# Patient Record
Sex: FEMALE | Race: WHITE | NOT HISPANIC OR LATINO | URBAN - METROPOLITAN AREA
[De-identification: names, ages, dates, MRNs, and addresses within clinical notes are randomized per-mention and may not be internally consistent; named-entity substitution may affect disease eponyms.]

---

## 2020-06-18 ENCOUNTER — OFFICE VISIT (OUTPATIENT)
Dept: URBAN - METROPOLITAN AREA CLINIC 23 | Facility: CLINIC | Age: 70
End: 2020-06-18

## 2020-06-18 RX ORDER — DEXLANSOPRAZOLE 30 MG/1
TAKE 1 CAPSULE (30 MG) BY ORAL ROUTE ONCE DAILY SWALLOWING WHOLE. DO NOT CRUSH, CHEW AND/OR DIVIDE CAPSULE, DELAYED RELEASE ORAL 1
Qty: 90 | Refills: 1 | Status: ACTIVE | COMMUNITY
Start: 2020-05-12 | End: 1900-01-01

## 2020-06-18 RX ORDER — IBUPROFEN 200 MG/1
TAKE 1 TABLET (200 MG) BY ORAL ROUTE EVERY 6 HOURS AS NEEDED WITH FOOD TABLET, COATED ORAL
Qty: 0 | Refills: 0 | Status: ACTIVE | COMMUNITY
Start: 1900-01-01 | End: 1900-01-01

## 2020-06-18 RX ORDER — TRIAMTERENE AND HYDROCHLOROTHIAZIDE 37.5; 25 MG/1; MG/1
TAKE 1 CAPSULE BY ORAL ROUTE ONCE DAILY CAPSULE ORAL 1
Qty: 0 | Refills: 0 | Status: ACTIVE | COMMUNITY
Start: 1900-01-01 | End: 1900-01-01

## 2020-06-18 RX ORDER — NYSTATIN AND TRIAMCINOLONE ACETONIDE 100000; 1 [USP'U]/G; MG/G
OINTMENT TOPICAL
Qty: 0 | Refills: 0 | Status: ACTIVE | COMMUNITY
Start: 1900-01-01 | End: 1900-01-01

## 2020-11-30 ENCOUNTER — OFFICE VISIT (OUTPATIENT)
Dept: URBAN - METROPOLITAN AREA CLINIC 23 | Facility: CLINIC | Age: 70
End: 2020-11-30

## 2021-02-01 ENCOUNTER — OFFICE VISIT (OUTPATIENT)
Dept: URBAN - METROPOLITAN AREA CLINIC 23 | Facility: CLINIC | Age: 71
End: 2021-02-01
Payer: MEDICARE

## 2021-02-01 VITALS — WEIGHT: 169 LBS | TEMPERATURE: 96.1 F | BODY MASS INDEX: 33.18 KG/M2 | HEIGHT: 60 IN

## 2021-02-01 DIAGNOSIS — K31.89 INTESTINAL METAPLASIA OF GASTRIC CARDIA: ICD-10-CM

## 2021-02-01 DIAGNOSIS — Z12.11 SCREEN FOR COLON CANCER: ICD-10-CM

## 2021-02-01 DIAGNOSIS — K20.90 ESOPHAGITIS: ICD-10-CM

## 2021-02-01 DIAGNOSIS — K20.80 ESOPHAGITIS, LOS ANGELES GRADE B: ICD-10-CM

## 2021-02-01 DIAGNOSIS — K21.9 ACID REFLUX DISEASE: ICD-10-CM

## 2021-02-01 PROCEDURE — 99213 OFFICE O/P EST LOW 20 MIN: CPT | Performed by: INTERNAL MEDICINE

## 2021-02-01 PROCEDURE — 3017F COLORECTAL CA SCREEN DOC REV: CPT | Performed by: INTERNAL MEDICINE

## 2021-02-01 PROCEDURE — 1036F TOBACCO NON-USER: CPT | Performed by: INTERNAL MEDICINE

## 2021-02-01 PROCEDURE — G8420 CALC BMI NORM PARAMETERS: HCPCS | Performed by: INTERNAL MEDICINE

## 2021-02-01 PROCEDURE — G8427 DOCREV CUR MEDS BY ELIG CLIN: HCPCS | Performed by: INTERNAL MEDICINE

## 2021-02-01 PROCEDURE — G9903 PT SCRN TBCO ID AS NON USER: HCPCS | Performed by: INTERNAL MEDICINE

## 2021-02-01 RX ORDER — IBUPROFEN 200 MG/1
TAKE 1 TABLET (200 MG) BY ORAL ROUTE EVERY 6 HOURS AS NEEDED WITH FOOD TABLET, COATED ORAL
Qty: 0 | Refills: 0 | COMMUNITY
Start: 1900-01-01

## 2021-02-01 RX ORDER — DEXLANSOPRAZOLE 30 MG/1
TAKE 1 CAPSULE (30 MG) BY ORAL ROUTE ONCE DAILY SWALLOWING WHOLE. DO NOT CRUSH, CHEW AND/OR DIVIDE CAPSULE, DELAYED RELEASE ORAL 1
Qty: 90 | Refills: 1 | COMMUNITY
Start: 2020-05-12

## 2021-02-01 RX ORDER — TRIAMTERENE AND HYDROCHLOROTHIAZIDE 37.5; 25 MG/1; MG/1
TAKE 1 CAPSULE BY ORAL ROUTE ONCE DAILY CAPSULE ORAL 1
Qty: 0 | Refills: 0 | COMMUNITY
Start: 1900-01-01

## 2021-02-01 RX ORDER — NYSTATIN AND TRIAMCINOLONE ACETONIDE 100000; 1 [USP'U]/G; MG/G
OINTMENT TOPICAL
Qty: 0 | Refills: 0 | COMMUNITY
Start: 1900-01-01

## 2021-02-01 NOTE — HPI-TODAY'S VISIT:
71 yo female presenting for f/u- last seen in 11/2019  she is doing well. using the dexilant as needed at this point actually.  using 1-2 times a week at most. usually food related- spicy food , tomatoes -she states that she is trying to modify the diet -has gained weight during covid -she is walking 3-5 miles a day -no abdominal pain no n/v/ dysphagia

## 2021-03-14 PROBLEM — 718346009 INTESTINAL METAPLASIA OF GASTRIC CARDIA: Status: ACTIVE | Noted: 2021-02-01

## 2021-04-06 ENCOUNTER — OFFICE VISIT (OUTPATIENT)
Dept: URBAN - METROPOLITAN AREA SURGERY CENTER 15 | Facility: SURGERY CENTER | Age: 71
End: 2021-04-06
Payer: MEDICARE

## 2021-04-06 ENCOUNTER — CLAIMS CREATED FROM THE CLAIM WINDOW (OUTPATIENT)
Dept: URBAN - METROPOLITAN AREA CLINIC 4 | Facility: CLINIC | Age: 71
End: 2021-04-06
Payer: MEDICARE

## 2021-04-06 DIAGNOSIS — K29.60 OTHER GASTRITIS WITHOUT BLEEDING: ICD-10-CM

## 2021-04-06 DIAGNOSIS — Z12.11 COLON CANCER SCREENING: ICD-10-CM

## 2021-04-06 DIAGNOSIS — D12.4 ADENOMA OF DESCENDING COLON: ICD-10-CM

## 2021-04-06 DIAGNOSIS — K22.70 BARRETT'S ESOPHAGUS WITHOUT DYSPLASIA: ICD-10-CM

## 2021-04-06 DIAGNOSIS — B96.81 BACTERIAL INFECTION DUE TO H. PYLORI: ICD-10-CM

## 2021-04-06 DIAGNOSIS — K22.70 BARRETT ESOPHAGUS: ICD-10-CM

## 2021-04-06 DIAGNOSIS — D12.4 BENIGN NEOPLASM OF DESCENDING COLON: ICD-10-CM

## 2021-04-06 DIAGNOSIS — K29.60 ADENOPAPILLOMATOSIS GASTRICA: ICD-10-CM

## 2021-04-06 DIAGNOSIS — B96.81 HELICOBACTER PYLORI [H. PYLORI] AS THE CAUSE OF DISEASES CLASSIFIED ELSEWHERE: ICD-10-CM

## 2021-04-06 PROCEDURE — 88305 TISSUE EXAM BY PATHOLOGIST: CPT | Performed by: PATHOLOGY

## 2021-04-06 PROCEDURE — G8907 PT DOC NO EVENTS ON DISCHARG: HCPCS | Performed by: INTERNAL MEDICINE

## 2021-04-06 PROCEDURE — 88312 SPECIAL STAINS GROUP 1: CPT | Performed by: PATHOLOGY

## 2021-04-06 PROCEDURE — 45380 COLONOSCOPY AND BIOPSY: CPT | Performed by: INTERNAL MEDICINE

## 2021-04-06 PROCEDURE — 43239 EGD BIOPSY SINGLE/MULTIPLE: CPT | Performed by: INTERNAL MEDICINE

## 2021-04-06 RX ORDER — NYSTATIN AND TRIAMCINOLONE ACETONIDE 100000; 1 [USP'U]/G; MG/G
OINTMENT TOPICAL
Qty: 0 | Refills: 0 | COMMUNITY
Start: 1900-01-01

## 2021-04-06 RX ORDER — TRIAMTERENE AND HYDROCHLOROTHIAZIDE 37.5; 25 MG/1; MG/1
TAKE 1 CAPSULE BY ORAL ROUTE ONCE DAILY CAPSULE ORAL 1
Qty: 0 | Refills: 0 | COMMUNITY
Start: 1900-01-01

## 2021-04-06 RX ORDER — DEXLANSOPRAZOLE 30 MG/1
TAKE 1 CAPSULE (30 MG) BY ORAL ROUTE ONCE DAILY SWALLOWING WHOLE. DO NOT CRUSH, CHEW AND/OR DIVIDE CAPSULE, DELAYED RELEASE ORAL 1
Qty: 90 | Refills: 1 | COMMUNITY
Start: 2020-05-12

## 2021-04-06 RX ORDER — IBUPROFEN 200 MG/1
TAKE 1 TABLET (200 MG) BY ORAL ROUTE EVERY 6 HOURS AS NEEDED WITH FOOD TABLET, COATED ORAL
Qty: 0 | Refills: 0 | COMMUNITY
Start: 1900-01-01

## 2021-04-29 ENCOUNTER — TELEPHONE ENCOUNTER (OUTPATIENT)
Dept: URBAN - METROPOLITAN AREA CLINIC 92 | Facility: CLINIC | Age: 71
End: 2021-04-29

## 2021-04-29 RX ORDER — AMOXICILLIN 500 MG/1
2 CAPSULES TABLET, FILM COATED ORAL TWICE A DAY
Qty: 56 | Refills: 0 | OUTPATIENT
Start: 2021-05-11

## 2021-04-29 RX ORDER — CLARITHROMYCIN 500 MG/1
1 TABLET TABLET, FILM COATED ORAL
Qty: 28 TABLET | Refills: 0 | OUTPATIENT
Start: 2021-05-11 | End: 2021-05-25

## 2021-05-14 ENCOUNTER — TELEPHONE ENCOUNTER (OUTPATIENT)
Dept: URBAN - METROPOLITAN AREA CLINIC 49 | Facility: CLINIC | Age: 71
End: 2021-05-14

## 2021-05-14 RX ORDER — DEXLANSOPRAZOLE 30 MG/1
TAKE 1 CAPSULE (30 MG) BY ORAL ROUTE ONCE DAILY SWALLOWING WHOLE. DO NOT CRUSH, CHEW AND/OR DIVIDE CAPSULE, DELAYED RELEASE ORAL 1
Qty: 90 | Refills: 1
Start: 2020-05-12

## 2021-06-14 ENCOUNTER — OFFICE VISIT (OUTPATIENT)
Dept: URBAN - METROPOLITAN AREA CLINIC 23 | Facility: CLINIC | Age: 71
End: 2021-06-14
Payer: MEDICARE

## 2021-06-14 VITALS
TEMPERATURE: 97.9 F | HEIGHT: 60 IN | WEIGHT: 160 LBS | BODY MASS INDEX: 31.41 KG/M2 | DIASTOLIC BLOOD PRESSURE: 97 MMHG | HEART RATE: 60 BPM | SYSTOLIC BLOOD PRESSURE: 174 MMHG

## 2021-06-14 DIAGNOSIS — A04.8 HELICOBACTER PYLORI (H. PYLORI): ICD-10-CM

## 2021-06-14 DIAGNOSIS — K20.90 ESOPHAGITIS: ICD-10-CM

## 2021-06-14 DIAGNOSIS — K59.09 CHRONIC CONSTIPATION: ICD-10-CM

## 2021-06-14 DIAGNOSIS — K21.9 ACID REFLUX DISEASE: ICD-10-CM

## 2021-06-14 DIAGNOSIS — Z86.010 PERSONAL HISTORY OF COLONIC POLYPS: ICD-10-CM

## 2021-06-14 DIAGNOSIS — K22.70 BARRETT ESOPHAGUS: ICD-10-CM

## 2021-06-14 PROBLEM — 275978004 SCREENING FOR MALIGNANT NEOPLASM OF COLON: Status: ACTIVE | Noted: 2021-02-01

## 2021-06-14 PROCEDURE — 99214 OFFICE O/P EST MOD 30 MIN: CPT | Performed by: INTERNAL MEDICINE

## 2021-06-14 RX ORDER — DEXLANSOPRAZOLE 30 MG/1
TAKE 1 CAPSULE (30 MG) BY ORAL ROUTE ONCE DAILY SWALLOWING WHOLE. DO NOT CRUSH, CHEW AND/OR DIVIDE CAPSULE, DELAYED RELEASE ORAL 1
Qty: 90 | Refills: 1 | Status: ACTIVE | COMMUNITY
Start: 2020-05-12

## 2021-06-14 RX ORDER — NYSTATIN AND TRIAMCINOLONE ACETONIDE 100000; 1 [USP'U]/G; MG/G
OINTMENT TOPICAL
Qty: 0 | Refills: 0 | COMMUNITY
Start: 1900-01-01

## 2021-06-14 RX ORDER — IBUPROFEN 200 MG/1
TAKE 1 TABLET (200 MG) BY ORAL ROUTE EVERY 6 HOURS AS NEEDED WITH FOOD TABLET, COATED ORAL
Qty: 0 | Refills: 0 | COMMUNITY
Start: 1900-01-01

## 2021-06-14 RX ORDER — AMOXICILLIN 500 MG/1
2 CAPSULES TABLET, FILM COATED ORAL TWICE A DAY
Qty: 56 | Refills: 0 | Status: ACTIVE | COMMUNITY
Start: 2021-05-11

## 2021-06-14 RX ORDER — TRIAMTERENE AND HYDROCHLOROTHIAZIDE 37.5; 25 MG/1; MG/1
TAKE 1 CAPSULE BY ORAL ROUTE ONCE DAILY CAPSULE ORAL 1
Qty: 0 | Refills: 0 | COMMUNITY
Start: 1900-01-01

## 2021-06-14 NOTE — HPI-TODAY'S VISIT:
71 yo female presenting for f/u- last seen in 11/2019  she is doing well. using the dexilant as needed at this point actually.  using 1-2 times a week at most. usually food related- spicy food , tomatoes -she states that she is trying to modify the diet -has gained weight during covid -she is walking 3-5 miles a day -no abdominal pain no n/v/ dysphagia ====================================================================================== 6/14/2021 completed h pylori treatment feels that her generalized abdominal discomfort/puffiness is gone.  no loose stool.  no reflux no other complaints has more constipated- has a longstanding history of baseline constipation

## 2021-06-16 LAB — H PYLORI BREATH TEST: NEGATIVE

## 2021-12-06 ENCOUNTER — TELEPHONE ENCOUNTER (OUTPATIENT)
Dept: URBAN - METROPOLITAN AREA CLINIC 92 | Facility: CLINIC | Age: 71
End: 2021-12-06

## 2021-12-06 RX ORDER — DEXLANSOPRAZOLE 30 MG/1
TAKE 1 CAPSULE (30 MG) BY ORAL ROUTE ONCE DAILY SWALLOWING WHOLE. DO NOT CRUSH, CHEW AND/OR DIVIDE CAPSULE, DELAYED RELEASE ORAL 1
Qty: 90 | Refills: 1
Start: 2020-05-12

## 2023-02-02 ENCOUNTER — TELEPHONE ENCOUNTER (OUTPATIENT)
Dept: URBAN - METROPOLITAN AREA CLINIC 82 | Facility: CLINIC | Age: 73
End: 2023-02-02

## 2023-02-02 RX ORDER — DEXLANSOPRAZOLE 30 MG/1
TAKE 1 CAPSULE (30 MG) BY ORAL ROUTE ONCE DAILY SWALLOWING WHOLE. DO NOT CRUSH, CHEW AND/OR DIVIDE CAPSULE, DELAYED RELEASE ORAL 1
Qty: 90 | Refills: 1
Start: 2020-05-12

## 2023-02-23 ENCOUNTER — DASHBOARD ENCOUNTERS (OUTPATIENT)
Age: 73
End: 2023-02-23

## 2023-02-23 ENCOUNTER — OFFICE VISIT (OUTPATIENT)
Dept: URBAN - METROPOLITAN AREA CLINIC 23 | Facility: CLINIC | Age: 73
End: 2023-02-23
Payer: MEDICARE

## 2023-02-23 VITALS
SYSTOLIC BLOOD PRESSURE: 145 MMHG | BODY MASS INDEX: 32.47 KG/M2 | TEMPERATURE: 97.9 F | WEIGHT: 165.4 LBS | DIASTOLIC BLOOD PRESSURE: 88 MMHG | HEIGHT: 60 IN

## 2023-02-23 DIAGNOSIS — A04.8 HELICOBACTER PYLORI (H. PYLORI): ICD-10-CM

## 2023-02-23 DIAGNOSIS — K22.70 BARRETT ESOPHAGUS: ICD-10-CM

## 2023-02-23 DIAGNOSIS — Z86.010 PERSONAL HISTORY OF COLONIC POLYPS: ICD-10-CM

## 2023-02-23 DIAGNOSIS — K20.90 ESOPHAGITIS: ICD-10-CM

## 2023-02-23 DIAGNOSIS — K59.09 CHRONIC CONSTIPATION: ICD-10-CM

## 2023-02-23 DIAGNOSIS — K21.9 ACID REFLUX DISEASE: ICD-10-CM

## 2023-02-23 PROBLEM — 428283002 HISTORY OF POLYP OF COLON (SITUATION): Status: ACTIVE | Noted: 2021-06-14

## 2023-02-23 PROBLEM — 236069009 CHRONIC CONSTIPATION: Status: ACTIVE | Noted: 2021-06-14

## 2023-02-23 PROBLEM — 80774000 HELICOBACTER PYLORI: Status: ACTIVE | Noted: 2021-06-14

## 2023-02-23 PROBLEM — 16761005 ESOPHAGITIS: Status: ACTIVE | Noted: 2021-02-01

## 2023-02-23 PROBLEM — 302914006 BARRETT ESOPHAGUS: Status: ACTIVE | Noted: 2021-06-14

## 2023-02-23 PROCEDURE — 99213 OFFICE O/P EST LOW 20 MIN: CPT | Performed by: INTERNAL MEDICINE

## 2023-02-23 RX ORDER — DEXLANSOPRAZOLE 30 MG/1
TAKE 1 CAPSULE (30 MG) BY ORAL ROUTE ONCE DAILY SWALLOWING WHOLE. DO NOT CRUSH, CHEW AND/OR DIVIDE CAPSULE, DELAYED RELEASE ORAL 1
Qty: 90 | Refills: 1
Start: 2020-05-12

## 2023-02-23 RX ORDER — AMOXICILLIN 500 MG/1
2 CAPSULES TABLET, FILM COATED ORAL TWICE A DAY
Qty: 56 | Refills: 0 | Status: ACTIVE | COMMUNITY
Start: 2021-05-11

## 2023-02-23 RX ORDER — NYSTATIN AND TRIAMCINOLONE ACETONIDE 100000; 1 [USP'U]/G; MG/G
OINTMENT TOPICAL
Qty: 0 | Refills: 0 | COMMUNITY
Start: 1900-01-01

## 2023-02-23 RX ORDER — TRIAMTERENE AND HYDROCHLOROTHIAZIDE 37.5; 25 MG/1; MG/1
TAKE 1 CAPSULE BY ORAL ROUTE ONCE DAILY CAPSULE ORAL 1
Qty: 0 | Refills: 0 | COMMUNITY
Start: 1900-01-01

## 2023-02-23 RX ORDER — IBUPROFEN 200 MG/1
TAKE 1 TABLET (200 MG) BY ORAL ROUTE EVERY 6 HOURS AS NEEDED WITH FOOD TABLET, COATED ORAL
Qty: 0 | Refills: 0 | COMMUNITY
Start: 1900-01-01

## 2023-02-23 RX ORDER — DEXLANSOPRAZOLE 30 MG/1
TAKE 1 CAPSULE (30 MG) BY ORAL ROUTE ONCE DAILY SWALLOWING WHOLE. DO NOT CRUSH, CHEW AND/OR DIVIDE CAPSULE, DELAYED RELEASE ORAL 1
Qty: 90 | Refills: 1 | Status: ACTIVE | COMMUNITY
Start: 2020-05-12

## 2023-02-23 NOTE — PREVIOUS WORKUP REVIEWED
REVIEWED EXTERNAL MEDICAL RECORD ,  , ENDOSCOPIES Colonoscopy April 2021--diminutive polyp in descending, diverticulosis.  Polyp pathology showed tubular adenoma. Upper endoscopy-April 2021-gastritis, hiatal hernia seen, salmon-colored mucosa seen.  Biopsies showed H. 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  Biopsies showed intestinal metaplasia

## 2023-02-23 NOTE — HPI-TODAY'S VISIT:
71 yo female presenting for f/u- last seen in 11/2019  she is doing well. using the dexilant as needed at this point actually.  using 1-2 times a week at most. usually food related- spicy food , tomatoes -she states that she is trying to modify the diet -has gained weight during covid -she is walking 3-5 miles a day -no abdominal pain no n/v/ dysphagia ====================================================================================== 6/14/2021 completed h pylori treatment feels that her generalized abdominal discomfort/puffiness is gone.  no loose stool.  no reflux no other complaints has more constipated- has a longstanding history of baseline constipation ============================================================================ 2/23/2023 states that she is not taking the dexilant daily -usually taking it every other day to every third day -no breakthrough on the days in  between -states that her bowel movements have been more managable has some issues with constiation and discomfort, seeing Dr. Campos her primary doctor -no abdominal pain weight has been stable

## 2023-08-09 ENCOUNTER — WEB ENCOUNTER (OUTPATIENT)
Dept: URBAN - METROPOLITAN AREA CLINIC 23 | Facility: CLINIC | Age: 73
End: 2023-08-09

## 2023-08-09 RX ORDER — DEXLANSOPRAZOLE 30 MG/1
TAKE 1 CAPSULE (30 MG) BY ORAL ROUTE ONCE DAILY SWALLOWING WHOLE. DO NOT CRUSH, CHEW AND/OR DIVIDE CAPSULE, DELAYED RELEASE ORAL 1
Qty: 90 | Refills: 1
Start: 2020-05-12

## 2024-01-18 ENCOUNTER — TELEPHONE ENCOUNTER (OUTPATIENT)
Dept: URBAN - METROPOLITAN AREA CLINIC 6 | Facility: CLINIC | Age: 74
End: 2024-01-18

## 2024-09-26 ENCOUNTER — TELEPHONE ENCOUNTER (OUTPATIENT)
Dept: URBAN - METROPOLITAN AREA CLINIC 23 | Facility: CLINIC | Age: 74
End: 2024-09-26

## 2024-09-26 RX ORDER — DEXLANSOPRAZOLE 30 MG/1
TAKE 1 CAPSULE (30 MG) BY ORAL ROUTE ONCE DAILY SWALLOWING WHOLE. DO NOT CRUSH, CHEW AND/OR DIVIDE CAPSULE, DELAYED RELEASE ORAL 1
Qty: 90 | Refills: 0
Start: 2020-05-12

## 2024-10-31 ENCOUNTER — OFFICE VISIT (OUTPATIENT)
Dept: URBAN - METROPOLITAN AREA CLINIC 23 | Facility: CLINIC | Age: 74
End: 2024-10-31

## 2024-10-31 ENCOUNTER — LAB OUTSIDE AN ENCOUNTER (OUTPATIENT)
Dept: URBAN - METROPOLITAN AREA CLINIC 23 | Facility: CLINIC | Age: 74
End: 2024-10-31

## 2024-10-31 VITALS
HEIGHT: 60 IN | SYSTOLIC BLOOD PRESSURE: 134 MMHG | BODY MASS INDEX: 34.16 KG/M2 | TEMPERATURE: 98.1 F | DIASTOLIC BLOOD PRESSURE: 81 MMHG | HEART RATE: 79 BPM | WEIGHT: 174 LBS

## 2024-10-31 RX ORDER — DEXLANSOPRAZOLE 30 MG/1
TAKE 1 CAPSULE (30 MG) BY ORAL ROUTE ONCE DAILY SWALLOWING WHOLE. DO NOT CRUSH, CHEW AND/OR DIVIDE CAPSULE, DELAYED RELEASE ORAL 1
Qty: 90 | Refills: 0 | Status: ACTIVE | COMMUNITY
Start: 2020-05-12

## 2024-10-31 RX ORDER — IBUPROFEN 200 MG/1
TAKE 1 TABLET (200 MG) BY ORAL ROUTE EVERY 6 HOURS AS NEEDED WITH FOOD TABLET, COATED ORAL
Qty: 0 | Refills: 0 | COMMUNITY
Start: 1900-01-01

## 2024-10-31 RX ORDER — TRIAMTERENE AND HYDROCHLOROTHIAZIDE 37.5; 25 MG/1; MG/1
TAKE 1 CAPSULE BY ORAL ROUTE ONCE DAILY CAPSULE ORAL 1
Qty: 0 | Refills: 0 | Status: ACTIVE | COMMUNITY
Start: 1900-01-01

## 2024-10-31 RX ORDER — NYSTATIN AND TRIAMCINOLONE ACETONIDE 100000; 1 [USP'U]/G; MG/G
OINTMENT TOPICAL
Qty: 0 | Refills: 0 | COMMUNITY
Start: 1900-01-01

## 2024-10-31 RX ORDER — DEXLANSOPRAZOLE 30 MG/1
TAKE 1 CAPSULE (30 MG) BY ORAL ROUTE ONCE DAILY SWALLOWING WHOLE. DO NOT CRUSH, CHEW AND/OR DIVIDE CAPSULE, DELAYED RELEASE ORAL 1
Qty: 90 | Refills: 3

## 2024-10-31 RX ORDER — AMOXICILLIN 500 MG/1
2 CAPSULES TABLET, FILM COATED ORAL TWICE A DAY
Qty: 56 | Refills: 0 | Status: DISCONTINUED | COMMUNITY
Start: 2021-05-11

## 2024-10-31 NOTE — HPI-TODAY'S VISIT:
- Presents for follow-up of gastroesophageal reflux disease (GERD) and medication review - Reports no heartburn or acid reflux symptoms while taking Dexlansoprazole daily - Denies any trouble swallowing or abdominal pain - Appetite is good and weight has been stable - Exercises daily by walking and doing yard work, keeps busy throughout the day -  has been in and out of the hospital since February with multiple admissions, but is now doing better after being diagnosed with a vitamin deficiency by his neurologist and started on 4 different vitamins

## 2024-12-27 ENCOUNTER — OFFICE VISIT (OUTPATIENT)
Dept: URBAN - METROPOLITAN AREA SURGERY CENTER 15 | Facility: SURGERY CENTER | Age: 74
End: 2024-12-27

## 2024-12-27 RX ORDER — NYSTATIN AND TRIAMCINOLONE ACETONIDE 100000; 1 [USP'U]/G; MG/G
OINTMENT TOPICAL
Qty: 0 | Refills: 0 | COMMUNITY
Start: 1900-01-01

## 2024-12-27 RX ORDER — IBUPROFEN 200 MG/1
TAKE 1 TABLET (200 MG) BY ORAL ROUTE EVERY 6 HOURS AS NEEDED WITH FOOD TABLET, COATED ORAL
Qty: 0 | Refills: 0 | COMMUNITY
Start: 1900-01-01

## 2024-12-27 RX ORDER — DEXLANSOPRAZOLE 30 MG/1
TAKE 1 CAPSULE (30 MG) BY ORAL ROUTE ONCE DAILY SWALLOWING WHOLE. DO NOT CRUSH, CHEW AND/OR DIVIDE CAPSULE, DELAYED RELEASE ORAL 1
Qty: 90 | Refills: 3 | Status: ACTIVE | COMMUNITY

## 2024-12-27 RX ORDER — TRIAMTERENE AND HYDROCHLOROTHIAZIDE 37.5; 25 MG/1; MG/1
TAKE 1 CAPSULE BY ORAL ROUTE ONCE DAILY CAPSULE ORAL 1
Qty: 0 | Refills: 0 | Status: ACTIVE | COMMUNITY
Start: 1900-01-01

## 2025-01-07 ENCOUNTER — OFFICE VISIT (OUTPATIENT)
Dept: URBAN - METROPOLITAN AREA CLINIC 23 | Facility: CLINIC | Age: 75
End: 2025-01-07
Payer: MEDICARE

## 2025-01-07 VITALS
HEIGHT: 60 IN | DIASTOLIC BLOOD PRESSURE: 92 MMHG | HEART RATE: 76 BPM | WEIGHT: 172.6 LBS | SYSTOLIC BLOOD PRESSURE: 166 MMHG | BODY MASS INDEX: 33.89 KG/M2

## 2025-01-07 DIAGNOSIS — Z87.19 HISTORY OF BARRETT ESOPHAGUS: ICD-10-CM

## 2025-01-07 DIAGNOSIS — K44.9 HIATAL HERNIA LARGE: ICD-10-CM

## 2025-01-07 DIAGNOSIS — K29.70 REACTIVE GASTROPATHY: ICD-10-CM

## 2025-01-07 DIAGNOSIS — Z86.0101 PERSONAL HISTORY OF ADENOMATOUS AND SERRATED COLON POLYPS: ICD-10-CM

## 2025-01-07 PROCEDURE — 99214 OFFICE O/P EST MOD 30 MIN: CPT

## 2025-01-07 RX ORDER — DEXLANSOPRAZOLE 30 MG/1
TAKE 1 CAPSULE (30 MG) BY ORAL ROUTE ONCE DAILY SWALLOWING WHOLE. DO NOT CRUSH, CHEW AND/OR DIVIDE CAPSULE, DELAYED RELEASE ORAL 1
Qty: 90 CAPSULE | Refills: 3

## 2025-01-07 RX ORDER — DEXLANSOPRAZOLE 30 MG/1
TAKE 1 CAPSULE (30 MG) BY ORAL ROUTE ONCE DAILY SWALLOWING WHOLE. DO NOT CRUSH, CHEW AND/OR DIVIDE CAPSULE, DELAYED RELEASE ORAL 1
Qty: 90 | Refills: 3 | Status: ACTIVE | COMMUNITY

## 2025-01-07 RX ORDER — TRIAMTERENE AND HYDROCHLOROTHIAZIDE 37.5; 25 MG/1; MG/1
TAKE 1 CAPSULE BY ORAL ROUTE ONCE DAILY CAPSULE ORAL 1
Qty: 0 | Refills: 0 | Status: ACTIVE | COMMUNITY
Start: 1900-01-01

## 2025-01-07 NOTE — HPI-TODAY'S VISIT:
74-year-old female here for follow-up after EGD.   She was last seen in clinic by Dr. Lorenz October 2024 for reflux.  Symptoms well-managed on Dexilant daily.  Recommend continue daily Dexilant use given history of Corey's.    She underwent EGD 12/27/2024 with Dr. Lorenz which showed 10cm hiatal hernia and gastritis. Bx showed chemical/reactive gastropathy. negative for h. pylori, dysplasia or malignancy.   Today, she states that reflux symptoms well controlled on dexilant. She is scheduled Feb 4th for consultation to discuss options for large hiatal hernia.   Due for colon screening in 2026.

## 2025-03-21 ENCOUNTER — TELEPHONE ENCOUNTER (OUTPATIENT)
Dept: URBAN - METROPOLITAN AREA CLINIC 23 | Facility: CLINIC | Age: 75
End: 2025-03-21

## 2025-03-27 ENCOUNTER — TELEPHONE ENCOUNTER (OUTPATIENT)
Dept: URBAN - METROPOLITAN AREA CLINIC 23 | Facility: CLINIC | Age: 75
End: 2025-03-27

## 2025-03-27 RX ORDER — DEXLANSOPRAZOLE 60 MG/1
1 CAPSULE 1/2 TO 1 HOUR BEFORE MORNING MEAL CAPSULE, DELAYED RELEASE PELLETS ORAL ONCE A DAY
Qty: 90 | Refills: 3 | OUTPATIENT
Start: 2025-03-27

## 2025-03-28 ENCOUNTER — WEB ENCOUNTER (OUTPATIENT)
Dept: URBAN - METROPOLITAN AREA CLINIC 23 | Facility: CLINIC | Age: 75
End: 2025-03-28